# Patient Record
Sex: MALE | NOT HISPANIC OR LATINO
[De-identification: names, ages, dates, MRNs, and addresses within clinical notes are randomized per-mention and may not be internally consistent; named-entity substitution may affect disease eponyms.]

---

## 2022-02-14 ENCOUNTER — TRANSCRIPTION ENCOUNTER (OUTPATIENT)
Age: 37
End: 2022-02-14

## 2022-03-25 ENCOUNTER — TRANSCRIPTION ENCOUNTER (OUTPATIENT)
Age: 37
End: 2022-03-25

## 2022-05-17 ENCOUNTER — NON-APPOINTMENT (OUTPATIENT)
Age: 37
End: 2022-05-17

## 2023-05-08 PROBLEM — Z00.00 ENCOUNTER FOR PREVENTIVE HEALTH EXAMINATION: Status: ACTIVE | Noted: 2023-05-08

## 2023-05-10 ENCOUNTER — APPOINTMENT (OUTPATIENT)
Dept: ORTHOPEDIC SURGERY | Facility: CLINIC | Age: 38
End: 2023-05-10
Payer: COMMERCIAL

## 2023-05-10 VITALS
SYSTOLIC BLOOD PRESSURE: 138 MMHG | HEIGHT: 72 IN | WEIGHT: 210 LBS | OXYGEN SATURATION: 99 % | HEART RATE: 77 BPM | BODY MASS INDEX: 28.44 KG/M2 | DIASTOLIC BLOOD PRESSURE: 96 MMHG

## 2023-05-10 DIAGNOSIS — M23.52 CHRONIC INSTABILITY OF KNEE, LEFT KNEE: ICD-10-CM

## 2023-05-10 DIAGNOSIS — S83.512A SPRAIN OF ANTERIOR CRUCIATE LIGAMENT OF LEFT KNEE, INITIAL ENCOUNTER: ICD-10-CM

## 2023-05-10 PROCEDURE — 99204 OFFICE O/P NEW MOD 45 MIN: CPT

## 2023-06-07 PROBLEM — M23.52 OLD DISRUPTION OF ANTERIOR CRUCIATE LIGAMENT OF LEFT KNEE: Status: ACTIVE | Noted: 2023-06-07

## 2023-06-07 PROBLEM — S83.512A CHRONIC RUPTURE OF ANTERIOR CRUCIATE LIGAMENT OF LEFT KNEE: Status: ACTIVE | Noted: 2023-06-07

## 2023-06-07 NOTE — DISCUSSION/SUMMARY
[de-identified] : 37 year old male with bilateral chronic ACL tears, early arthrosis, degenerative meniscal pathology\par \par Patient presents with significant dysfunction to the knees bilaterally.  There appears to be chronic ACL insufficiency on both knees with subsequent development of osteoarthritic change.  There is degenerative meniscal pathology throughout.  We discussed consideration of surgical intervention of the left knee given his symptoms and evidence of chronic bucket-handle component of the medial meniscus. \par \par Although he has chronic ACL tears in both knees, he has not been experiencing symptoms of buckling or instability. He has been quite active prior to his recent meniscal tears in the left knee. I discussed how he would benefit from meniscectomy only to relieve his current symptoms. I explained how he already has arthritis in his knees and could be looking at total knee replacements in the future. I do not think I would recommend ACL reconstruction given the chronicity of the tears, minor symptoms, as well as degree of degenerative changes in both knees. \par \par All risks, benefits and alternatives to a left knee arthroscopic partial medial and lateral meniscectomy chondral surfaces were discussed in great detail with the patient. Risks include but are not limited to pain, bleeding, infection, stiffness/instability, impingement, and risks of anesthesia. The patient expressed understanding and all questions were answered. \par  \par The patient will weigh his options, and follow-up if he would like to proceed with arthroscopy of the left knee.\par \par

## 2023-06-07 NOTE — ADDENDUM
[FreeTextEntry1] : I, Rick Pratt, acted solely as a scribe for Dr. Marquis Castellanos MD on  05/10/2023.\par \par All medical record entries made by the Scribe were at my, Dr. Marquis Castellanos MD, direction and personally dictated by me on 05/10/2023. I have personally reviewed the chart and agree that the record accurately reflects my personal performance of the history, physical exam, assessment and plan.

## 2023-06-07 NOTE — HISTORY OF PRESENT ILLNESS
[de-identified] : 37 year old male presents today with acute on chronic bilateral knee pain x 2 months. Patient states that he started exercising more recently and playing volleyball.  The pain is constant worse with exertion. He reports of associated swelling. He reports of intermittent clicking and locking in the left knee. Denies any buckling, or instability in the knee.  Patient is . He is not taking pain medication. He has brought with him MRI of the right and left knee for review.  PMHx chronic left ACL tear 6 -7 years ago, while playing volleyball. He reports treating it conservatively, no surgical intervention. He states he did not remember a prior injury in the right knee before his recent injury 2 months ago. \par \par The patient's past medical history, past surgical history, medications and allergies were reviewed by me today with the patient and documented accordingly. In addition, the patient's family and social history, which were noncontributory to this visit, were reviewed also.

## 2023-06-07 NOTE — PHYSICAL EXAM
[de-identified] : Oriented to time, place, person\par Mood: Normal\par Affect: Normal\par Appearance: Healthy, well appearing, no acute distress.\par Gait: Normal\par Assistive Devices: None \par \par Right knee exam\par \par Skin: Clean, dry, intact\par Inspection: No obvious malalignment, no masses, no swelling, no effusion\par Pulses: 2+ DP/PT pulses\par ROM: 0- 140 degrees of flexion. No pain with deep knee flexion/extension.\par Tenderness: No MJLT. No LJLT. No pain over the patella facets. No pain to the quadriceps tendon. No pain to the patella tendon. No posterior knee tenderness.\par Stability: Stable to varus, valgus. 2B Lachman testing. Positive anterior drawer, negative posterior drawer.\par Strength: 5/5 Q/H/TA/GS/EHL, without atrophy\par Neuro: In tact to light touch throughout, DTR's normal\par Additional tests: Negative McMurrays test, Negative patellar grind test. \par \par Left knee exam\par \par Skin: Clean, dry, intact\par Inspection: No obvious malalignment, no masses, swelling in the posterior joint, no effusion\par Pulses: 2+ DP/PT pulses\par ROM: 0-130 degrees of flexion. No pain with deep knee flexion/extension.\par Tenderness: + MJLT. + LJLT. No pain over the patella facets. No pain to the quadriceps tendon. No pain to the patella tendon. No posterior knee tenderness.\par Stability: Stable to varus, valgus. 2B Lachman testing. Positive anterior drawer, negative posterior drawer.\par Strength: 5/5 Q/H/TA/GS/EHL, without atrophy\par Neuro: In tact to light touch throughout, DTR's normal\par Additional tests: Negative McMurrays test, Negative patellar grind test.  [de-identified] : Outside XR of the right knee obtained on 01/17/22 at [facility], reviewed today by me which revealed:\par  \par 4 views of the right knee that show no acute fracture or dislocation. There is trace medial, no lateral and no patellofemoral degenerative changes seen. Quadriceps enthesophyte. There is no significant malalignment. No significant other obvious osseous abnormality, otherwise unremarkable. \par \par Outside XR of the left knee obtained on 01/17/22 at [facility], reviewed today by me which revealed:\par \par 4 views of the left knee that show no acute fracture or dislocation. There is mild medial, mild lateral and no patellofemoral degenerative changes seen. Quadriceps enthesophyte. There is no significant malalignment. No significant other obvious osseous abnormality, otherwise unremarkable. \par \par MRI of the right knee obtained on 03/28/23 at [facility], reviewed today by me which revealed chronic tear of the ACL with degenerative medial and lateral meniscus tears with chronic chondral loss.\par \par MRI of the left knee obtained on 03/28/23 at [facility], reviewed today by me which revealed chronic tear of the ACL as well as chronic appearing bucket-handle medial meniscus tear, degenerative lateral meniscus tear with arthrosis.

## 2024-02-13 ENCOUNTER — NON-APPOINTMENT (OUTPATIENT)
Age: 39
End: 2024-02-13